# Patient Record
Sex: MALE | Race: WHITE | URBAN - METROPOLITAN AREA
[De-identification: names, ages, dates, MRNs, and addresses within clinical notes are randomized per-mention and may not be internally consistent; named-entity substitution may affect disease eponyms.]

---

## 2023-07-27 ENCOUNTER — LAB OUTSIDE AN ENCOUNTER (OUTPATIENT)
Dept: URBAN - METROPOLITAN AREA CLINIC 74 | Facility: CLINIC | Age: 68
End: 2023-07-27

## 2023-07-27 ENCOUNTER — WEB ENCOUNTER (OUTPATIENT)
Dept: URBAN - METROPOLITAN AREA CLINIC 74 | Facility: CLINIC | Age: 68
End: 2023-07-27

## 2023-07-27 ENCOUNTER — DASHBOARD ENCOUNTERS (OUTPATIENT)
Age: 68
End: 2023-07-27

## 2023-07-27 ENCOUNTER — OFFICE VISIT (OUTPATIENT)
Dept: URBAN - METROPOLITAN AREA CLINIC 74 | Facility: CLINIC | Age: 68
End: 2023-07-27
Payer: MEDICARE

## 2023-07-27 VITALS
HEIGHT: 67 IN | DIASTOLIC BLOOD PRESSURE: 70 MMHG | TEMPERATURE: 97.5 F | WEIGHT: 191 LBS | BODY MASS INDEX: 29.98 KG/M2 | HEART RATE: 68 BPM | SYSTOLIC BLOOD PRESSURE: 130 MMHG

## 2023-07-27 DIAGNOSIS — L29.9 PRURITUS: ICD-10-CM

## 2023-07-27 DIAGNOSIS — R63.4 WEIGHT LOSS: ICD-10-CM

## 2023-07-27 DIAGNOSIS — R17 JAUNDICE: ICD-10-CM

## 2023-07-27 DIAGNOSIS — R16.0 HEPATOMEGALY: ICD-10-CM

## 2023-07-27 PROBLEM — 398032003: Status: ACTIVE | Noted: 2023-07-27

## 2023-07-27 PROBLEM — 80515008: Status: ACTIVE | Noted: 2023-07-27

## 2023-07-27 PROBLEM — 1240423001: Status: ACTIVE | Noted: 2023-07-27

## 2023-07-27 PROBLEM — 89362005: Status: ACTIVE | Noted: 2023-07-27

## 2023-07-27 PROBLEM — 88111009: Status: ACTIVE | Noted: 2023-07-27

## 2023-07-27 PROBLEM — 162863004: Status: ACTIVE | Noted: 2023-07-27

## 2023-07-27 PROBLEM — 18165001: Status: ACTIVE | Noted: 2023-07-27

## 2023-07-27 PROCEDURE — 99204 OFFICE O/P NEW MOD 45 MIN: CPT | Performed by: INTERNAL MEDICINE

## 2023-07-27 NOTE — PHYSICAL EXAM CONSTITUTIONAL:
well-developed, well nourished, in no acute distress, ambulating without difficulty, normal communication ability, deep[LY jaundiced

## 2023-07-27 NOTE — HPI-TODAY'S VISIT:
The patient is a 67-year-old  male self-referred who presents to the office in the company of his wife stating that he has been told that he is jaundiced.  The patient states that for the past 2 months he has experienced generalized pruritus, was told by family members and coworkers that he had jaundiced eyes and jaundiced skin which has gradually gotten worse, along with it the patient noticed light color stools and darker urine.  The patient had been taking for a period of 2 months he took 2 g of acetaminophen on a over-the-counter preparation which also had pamabrom 25 mg.  The patient along with the above medication took multiple over-the-counter supplements including milk thistle.  He cannot recall the rest of them and will contact the office with a list of the medications.  The patient states that he has lost about 20 pounds in the last 2 months, he states that his appetite is good but he has slight early satiety.  The patient has occasional nausea with no vomiting, there is no dysphagia, odynophagia, heartburn, nausea, vomiting, hematemesis or melena.  The patient has also experienced a change in bowel habit, he claims that he intermittently gets right lower quadrant cramping abdominal pain followed by multiple loose bowel movements with no blood.  On alternate days he will have either no stool or type VI stool on the [place] scale.  He denies having any lower abdominal pain on the days that he does not get the cramping discomfort.  The patient denies any alcohol use, there is no past history of viral hepatitis or drug use.  The patient has been recommended to get a CBC, CMP, alpha-fetoprotein, PT/INR, hepatitis panel, iron studies and ferritin, ceruloplasmin.  We will also obtain an RUBEN, anti-smooth muscle antibodies and antimitochondrial antibodies.  The patient will be scheduled to get a CT of the abdomen and pelvis with contrast as long as the renal function allows it and would get an MRI with and without contrast of the liver.  The patient might need to get an MRCP.  The patient will return for a follow-up visit after completion of testing.

## 2023-07-27 NOTE — PHYSICAL EXAM GASTROINTESTINAL
Abdomen, soft, nontender, nondistended, no guarding or rigidity, no masses palpable, normal bowel sounds, liver is enlarged and right lobe is 6 cm below the costal margin, hard,  nontender

## 2023-07-28 ENCOUNTER — TELEPHONE ENCOUNTER (OUTPATIENT)
Dept: URBAN - METROPOLITAN AREA CLINIC 40 | Facility: CLINIC | Age: 68
End: 2023-07-28

## 2023-08-04 LAB
A/G RATIO: 0.9
ABSOLUTE BASOPHILS: 66
ABSOLUTE EOSINOPHILS: 183
ABSOLUTE LYMPHOCYTES: 1677
ABSOLUTE MONOCYTES: 930
ABSOLUTE NEUTROPHILS: (no result)
ACTIN (SMOOTH MUSCLE) ANTIBODY (IGG): <20
AFP, SERUM: 2.3
AFP-L3%, SERUM: (no result)
ALBUMIN: 2.5
ALKALINE PHOSPHATASE: 1002
ALT (SGPT): 205
ANA SCREEN, IFA: POSITIVE
AST (SGOT): 254
BASOPHILS: 0.4
BILIRUBIN, TOTAL: 20.3
BUN/CREATININE RATIO: (no result)
BUN: 17
CALCIUM: 7.9
CARBON DIOXIDE, TOTAL: 26
CERULOPLASMIN: 51
CHLORIDE: 98
CREATININE: 1.09
EGFR: 75
EOSINOPHILS: 1.1
FERRITIN, SERUM: 2300
GLOBULIN, TOTAL: 2.7
GLUCOSE: 77
HBSAG SCREEN: (no result)
HEMATOCRIT: 32.3
HEMOGLOBIN: 11.4
HEP A AB, IGM: (no result)
HEP B CORE AB, IGM: (no result)
HEPATITIS C ANTIBODY: (no result)
INR: 2.4
IRON BIND.CAP.(TIBC): 224
IRON SATURATION: 13
IRON: 30
LYMPHOCYTES: 10.1
MCH: 30.9
MCHC: 35.3
MCV: 87.5
MITOCHONDRIAL (M2) ANTIBODY: <=20
MONOCYTES: 5.6
MPV: 9.9
NEUTROPHILS: 82.8
PLATELET COUNT: 276
POTASSIUM: 3.5
PROTEIN, TOTAL: 5.2
PT: 24.2
RDW: 13.8
RED BLOOD CELL COUNT: 3.69
SODIUM: 135
WHITE BLOOD CELL COUNT: 16.6

## 2023-08-07 ENCOUNTER — TELEPHONE ENCOUNTER (OUTPATIENT)
Dept: URBAN - METROPOLITAN AREA MEDICAL CENTER 28 | Facility: MEDICAL CENTER | Age: 68
End: 2023-08-07

## 2023-08-07 ENCOUNTER — TELEPHONE ENCOUNTER (OUTPATIENT)
Dept: URBAN - METROPOLITAN AREA CLINIC 40 | Facility: CLINIC | Age: 68
End: 2023-08-07

## 2023-08-09 ENCOUNTER — TELEPHONE ENCOUNTER (OUTPATIENT)
Dept: URBAN - METROPOLITAN AREA CLINIC 40 | Facility: CLINIC | Age: 68
End: 2023-08-09

## 2023-08-09 ENCOUNTER — CLAIMS CREATED FROM THE CLAIM WINDOW (OUTPATIENT)
Dept: URBAN - METROPOLITAN AREA MEDICAL CENTER 28 | Facility: MEDICAL CENTER | Age: 68
End: 2023-08-09

## 2023-08-09 ENCOUNTER — OFFICE VISIT (OUTPATIENT)
Dept: URBAN - METROPOLITAN AREA MEDICAL CENTER 28 | Facility: MEDICAL CENTER | Age: 68
End: 2023-08-09
Payer: MEDICARE

## 2023-08-09 ENCOUNTER — LAB OUTSIDE AN ENCOUNTER (OUTPATIENT)
Dept: URBAN - METROPOLITAN AREA CLINIC 98 | Facility: CLINIC | Age: 68
End: 2023-08-09

## 2023-08-09 DIAGNOSIS — D72.829 LEUKOCYTOSIS: ICD-10-CM

## 2023-08-09 DIAGNOSIS — R79.89 ABNORMAL BILIRUBIN TEST: ICD-10-CM

## 2023-08-09 DIAGNOSIS — K76.89 ABNORMAL FINDING ON LIVER FUNCTION: ICD-10-CM

## 2023-08-09 DIAGNOSIS — R17 CHOLESTATIC JAUNDICE: ICD-10-CM

## 2023-08-09 DIAGNOSIS — R79.1 ABNORMAL BLEEDING TIME: ICD-10-CM

## 2023-08-09 DIAGNOSIS — R74.8 ABNORMAL ALKALINE PHOSPHATASE TEST: ICD-10-CM

## 2023-08-09 DIAGNOSIS — K86.89 ACUTE PANCREATIC FLUID COLLECTION: ICD-10-CM

## 2023-08-09 LAB
ABSOLUTE NRBC COUNT: 0
AG RATIO: 1
ALBUMIN LEVEL: 2.4
ALK PHOS: 1361
ALT: 208
ANION GAP: 11
AST: 271
BILIRUBIN TOTAL: 17.2
BUN/CREAT RATIO: 15
BUN: 15
CALCIUM LEVEL: 8.6
CHLORIDE LEVEL: 99
CO2 LEVEL: 27
CREATININE LEVEL: 1
GFR 2021: >60
GLUCOSE LEVEL: 94
HCT: 33.9
HGB: 11.4
INR: 3.5
MCH: 30.2
MCHC: 33.6
MCV: 89.9
MPV: 10.3
NRBC AUTO: 0
OSMO (CALC): 274
PERFORMING LAB: (no result)
PLATELETS: 298
POTASSIUM LEVEL: 3.4
PROTEIN TOTAL: 6.2
PT: 34.4
RBC: 3.77
RDW: 16.1
SODIUM LEVEL: 137
WBC: 16.7

## 2023-08-09 PROCEDURE — 992 APS NON BILLABLE: Performed by: INTERNAL MEDICINE

## 2023-08-09 PROCEDURE — 99222 1ST HOSP IP/OBS MODERATE 55: CPT | Performed by: PHYSICIAN ASSISTANT

## 2023-08-09 PROCEDURE — G8427 DOCREV CUR MEDS BY ELIG CLIN: HCPCS | Performed by: PHYSICIAN ASSISTANT

## 2023-08-09 PROCEDURE — 99232 SBSQ HOSP IP/OBS MODERATE 35: CPT | Performed by: PHYSICIAN ASSISTANT

## 2023-08-10 ENCOUNTER — OUT OF OFFICE VISIT (OUTPATIENT)
Dept: URBAN - METROPOLITAN AREA MEDICAL CENTER 28 | Facility: MEDICAL CENTER | Age: 68
End: 2023-08-10

## 2023-08-10 ENCOUNTER — CLAIMS CREATED FROM THE CLAIM WINDOW (OUTPATIENT)
Dept: URBAN - METROPOLITAN AREA MEDICAL CENTER 28 | Facility: MEDICAL CENTER | Age: 68
End: 2023-08-10
Payer: MEDICARE

## 2023-08-10 ENCOUNTER — LAB OUTSIDE AN ENCOUNTER (OUTPATIENT)
Dept: URBAN - METROPOLITAN AREA CLINIC 98 | Facility: CLINIC | Age: 68
End: 2023-08-10

## 2023-08-10 DIAGNOSIS — K83.1 AMPULLA OF VATER OBSTRUCTION SYNDROME: ICD-10-CM

## 2023-08-10 DIAGNOSIS — K86.89 ACUTE PANCREATIC FLUID COLLECTION: ICD-10-CM

## 2023-08-10 DIAGNOSIS — R17 CHOLESTATIC JAUNDICE: ICD-10-CM

## 2023-08-10 DIAGNOSIS — K83.8 BILIARY SLUDGE: ICD-10-CM

## 2023-08-10 PROCEDURE — 43274 ERCP DUCT STENT PLACEMENT: CPT | Performed by: INTERNAL MEDICINE

## 2023-08-10 PROCEDURE — 74328 X-RAY BILE DUCT ENDOSCOPY: CPT | Performed by: INTERNAL MEDICINE

## 2023-08-10 PROCEDURE — 43238 EGD US FINE NEEDLE BX/ASPIR: CPT | Performed by: INTERNAL MEDICINE

## 2023-08-10 PROCEDURE — 43264 ERCP REMOVE DUCT CALCULI: CPT | Performed by: INTERNAL MEDICINE

## 2023-08-14 ENCOUNTER — CLAIMS CREATED FROM THE CLAIM WINDOW (OUTPATIENT)
Dept: URBAN - METROPOLITAN AREA MEDICAL CENTER 28 | Facility: MEDICAL CENTER | Age: 68
End: 2023-08-14
Payer: MEDICARE

## 2023-08-14 ENCOUNTER — OUT OF OFFICE VISIT (OUTPATIENT)
Dept: URBAN - METROPOLITAN AREA MEDICAL CENTER 28 | Facility: MEDICAL CENTER | Age: 68
End: 2023-08-14

## 2023-08-14 DIAGNOSIS — K83.1 AMPULLA OF VATER OBSTRUCTION SYNDROME: ICD-10-CM

## 2023-08-14 DIAGNOSIS — K86.89 ACUTE PANCREATIC FLUID COLLECTION: ICD-10-CM

## 2023-08-14 PROCEDURE — 99232 SBSQ HOSP IP/OBS MODERATE 35: CPT | Performed by: PHYSICIAN ASSISTANT
